# Patient Record
(demographics unavailable — no encounter records)

---

## 2017-08-17 NOTE — [facility] OPERATIVE REPORT E
[facility] Operative [facility]



NAME: OMA GUNDERSON

                                      MRN: [id number]

                                      AGE: 09Y

DATE OF TREATMENT: 08/17/2017        ROOM:



PREOPERATIVE DIAGNOSIS:

Autism, cerebral palsy, acute situational anxiety, multiple carious teeth.



POSTOPERATIVE DIAGNOSIS:

Autism, cerebral palsy, acute situational anxiety, multiple carious teeth.



ADDITIONAL TESTS PERFORMED:

None.



SURGEON:

ASIF AVILA DDS, MPH



ANESTHESIOLOGIST:

Dr. Melissa ANDRADE; GERARDO Rivera



PROCEDURE:

After receiving final consent from the family, the patient was brought from

the holding area to room 4 at 10:27 a.m. after receiving 10 mg of Versed. 

Patient was placed in a supine position on the operating room table and

given an inhalation agent to induce unconsciousness.  The nasal intubation

was performed.  An IV was placed in the left antecubital.  A throat pack

was placed at 10:49 a.m. and dental treatment began at 10:49 a.m.  An

intraoral Betadine scrub was performed and the patient was draped.  Six

intraoral radiographs were obtained and read.



The following teeth received restorative treatment:

1.  Tooth #3 received a composite resin (OL, etch, bond, Z-250, SureFil).

2.  Tooth #14 received a composite resin (OL, etch, bond, Z-250, SureFil).

3.  Tooth #M received an EXT (Gelfoam).

4.  Tooth #R received an EXT (Gelfoam).

5.  Tooth #19 received a composite resin (O, etch, bond, Z-250, SureFil).

6.  Tooth #30 received a composite resin (O, etch, bond, Z-250, SureFil).



A full mouth dental debridement was completed and a fluoride varnish

treatment was completed.  The 0.5 mL of 2% lidocaine with 1:100,000

epinephrine was used for hemostasis and postoperative pain control.  The

sockets were packed with Gelfoam.  The throat pack was removed at 11:27 and

dental treatment was completed at 11:27.  The patient was undraped and

extubated in the operating room.





DICTATING PHYSICIAN: ASIF AVILA DDS



[id number]              DT: 08/17/2017 1205

PHY#: [id number]         DD: 08/17/2017 1155

ID:   [id number]               JOB#: [id number]       ACCT: [id number]



cc:ASIF AVILA DDS

>